# Patient Record
Sex: MALE | Employment: STUDENT | URBAN - METROPOLITAN AREA
[De-identification: names, ages, dates, MRNs, and addresses within clinical notes are randomized per-mention and may not be internally consistent; named-entity substitution may affect disease eponyms.]

---

## 2018-11-07 ENCOUNTER — HOSPITAL ENCOUNTER (EMERGENCY)
Facility: CLINIC | Age: 23
Discharge: HOME | End: 2018-11-08
Attending: EMERGENCY MEDICINE | Admitting: EMERGENCY MEDICINE

## 2018-11-07 ENCOUNTER — APPOINTMENT (OUTPATIENT)
Dept: CT IMAGING | Facility: CLINIC | Age: 23
End: 2018-11-07
Attending: EMERGENCY MEDICINE

## 2018-11-07 DIAGNOSIS — K52.9 GASTROENTERITIS: ICD-10-CM

## 2018-11-07 LAB
ALBUMIN SERPL-MCNC: 4.8 G/DL (ref 3.4–5)
ALP SERPL-CCNC: 88 U/L (ref 40–150)
ALT SERPL W P-5'-P-CCNC: 30 U/L (ref 0–70)
ANION GAP SERPL CALCULATED.3IONS-SCNC: 12 MMOL/L (ref 3–14)
AST SERPL W P-5'-P-CCNC: 19 U/L (ref 0–45)
BASOPHILS # BLD AUTO: 0 10E9/L (ref 0–0.2)
BASOPHILS NFR BLD AUTO: 0.2 %
BILIRUB SERPL-MCNC: 0.6 MG/DL (ref 0.2–1.3)
BUN SERPL-MCNC: 21 MG/DL (ref 7–30)
CALCIUM SERPL-MCNC: 10.2 MG/DL (ref 8.5–10.1)
CHLORIDE SERPL-SCNC: 103 MMOL/L (ref 94–109)
CO2 SERPL-SCNC: 21 MMOL/L (ref 20–32)
CREAT SERPL-MCNC: 1 MG/DL (ref 0.66–1.25)
DIFFERENTIAL METHOD BLD: ABNORMAL
EOSINOPHIL # BLD AUTO: 0.1 10E9/L (ref 0–0.7)
EOSINOPHIL NFR BLD AUTO: 0.6 %
ERYTHROCYTE [DISTWIDTH] IN BLOOD BY AUTOMATED COUNT: 12.1 % (ref 10–15)
FLUAV+FLUBV AG SPEC QL: NEGATIVE
FLUAV+FLUBV AG SPEC QL: NEGATIVE
GFR SERPL CREATININE-BSD FRML MDRD: >90 ML/MIN/1.7M2
GLUCOSE BLDC GLUCOMTR-MCNC: 121 MG/DL (ref 70–99)
GLUCOSE SERPL-MCNC: 145 MG/DL (ref 70–99)
HCT VFR BLD AUTO: 48.8 % (ref 40–53)
HGB BLD-MCNC: 16.8 G/DL (ref 13.3–17.7)
IMM GRANULOCYTES # BLD: 0 10E9/L (ref 0–0.4)
IMM GRANULOCYTES NFR BLD: 0.3 %
LACTATE BLD-SCNC: 3.5 MMOL/L (ref 0.7–2)
LIPASE SERPL-CCNC: 75 U/L (ref 73–393)
LYMPHOCYTES # BLD AUTO: 0.6 10E9/L (ref 0.8–5.3)
LYMPHOCYTES NFR BLD AUTO: 4.7 %
MCH RBC QN AUTO: 30.1 PG (ref 26.5–33)
MCHC RBC AUTO-ENTMCNC: 34.4 G/DL (ref 31.5–36.5)
MCV RBC AUTO: 87 FL (ref 78–100)
MONOCYTES # BLD AUTO: 1.3 10E9/L (ref 0–1.3)
MONOCYTES NFR BLD AUTO: 10.6 %
NEUTROPHILS # BLD AUTO: 10.3 10E9/L (ref 1.6–8.3)
NEUTROPHILS NFR BLD AUTO: 83.6 %
NRBC # BLD AUTO: 0 10*3/UL
NRBC BLD AUTO-RTO: 0 /100
PLATELET # BLD AUTO: 227 10E9/L (ref 150–450)
POTASSIUM SERPL-SCNC: 4.3 MMOL/L (ref 3.4–5.3)
PROT SERPL-MCNC: 9.1 G/DL (ref 6.8–8.8)
RBC # BLD AUTO: 5.59 10E12/L (ref 4.4–5.9)
SODIUM SERPL-SCNC: 136 MMOL/L (ref 133–144)
SPECIMEN SOURCE: NORMAL
WBC # BLD AUTO: 12.3 10E9/L (ref 4–11)

## 2018-11-07 PROCEDURE — 00000146 ZZHCL STATISTIC GLUCOSE BY METER IP

## 2018-11-07 PROCEDURE — 83690 ASSAY OF LIPASE: CPT | Performed by: EMERGENCY MEDICINE

## 2018-11-07 PROCEDURE — 99285 EMERGENCY DEPT VISIT HI MDM: CPT | Mod: 25

## 2018-11-07 PROCEDURE — 80053 COMPREHEN METABOLIC PANEL: CPT | Performed by: EMERGENCY MEDICINE

## 2018-11-07 PROCEDURE — 25000132 ZZH RX MED GY IP 250 OP 250 PS 637: Performed by: EMERGENCY MEDICINE

## 2018-11-07 PROCEDURE — 87804 INFLUENZA ASSAY W/OPTIC: CPT | Performed by: EMERGENCY MEDICINE

## 2018-11-07 PROCEDURE — 25000128 H RX IP 250 OP 636: Performed by: EMERGENCY MEDICINE

## 2018-11-07 PROCEDURE — 36415 COLL VENOUS BLD VENIPUNCTURE: CPT

## 2018-11-07 PROCEDURE — 96374 THER/PROPH/DIAG INJ IV PUSH: CPT | Mod: 59

## 2018-11-07 PROCEDURE — 87040 BLOOD CULTURE FOR BACTERIA: CPT | Performed by: EMERGENCY MEDICINE

## 2018-11-07 PROCEDURE — 85025 COMPLETE CBC W/AUTO DIFF WBC: CPT | Performed by: EMERGENCY MEDICINE

## 2018-11-07 PROCEDURE — 96361 HYDRATE IV INFUSION ADD-ON: CPT

## 2018-11-07 PROCEDURE — 83605 ASSAY OF LACTIC ACID: CPT | Performed by: EMERGENCY MEDICINE

## 2018-11-07 PROCEDURE — 74177 CT ABD & PELVIS W/CONTRAST: CPT

## 2018-11-07 RX ORDER — IOPAMIDOL 755 MG/ML
69 INJECTION, SOLUTION INTRAVASCULAR ONCE
Status: COMPLETED | OUTPATIENT
Start: 2018-11-07 | End: 2018-11-08

## 2018-11-07 RX ORDER — HYDROMORPHONE HYDROCHLORIDE 1 MG/ML
0.5 INJECTION, SOLUTION INTRAMUSCULAR; INTRAVENOUS; SUBCUTANEOUS
Status: COMPLETED | OUTPATIENT
Start: 2018-11-07 | End: 2018-11-07

## 2018-11-07 RX ORDER — SODIUM CHLORIDE, SODIUM LACTATE, POTASSIUM CHLORIDE, CALCIUM CHLORIDE 600; 310; 30; 20 MG/100ML; MG/100ML; MG/100ML; MG/100ML
INJECTION, SOLUTION INTRAVENOUS CONTINUOUS
Status: DISCONTINUED | OUTPATIENT
Start: 2018-11-07 | End: 2018-11-08 | Stop reason: HOSPADM

## 2018-11-07 RX ORDER — ACETAMINOPHEN 325 MG/1
975 TABLET ORAL ONCE
Status: COMPLETED | OUTPATIENT
Start: 2018-11-07 | End: 2018-11-07

## 2018-11-07 RX ADMIN — SODIUM CHLORIDE, POTASSIUM CHLORIDE, SODIUM LACTATE AND CALCIUM CHLORIDE 1860 ML: 600; 310; 30; 20 INJECTION, SOLUTION INTRAVENOUS at 23:51

## 2018-11-07 RX ADMIN — HYDROMORPHONE HYDROCHLORIDE 0.5 MG: 1 INJECTION, SOLUTION INTRAMUSCULAR; INTRAVENOUS; SUBCUTANEOUS at 23:30

## 2018-11-07 RX ADMIN — ACETAMINOPHEN 975 MG: 325 TABLET, FILM COATED ORAL at 23:30

## 2018-11-07 RX ADMIN — SODIUM CHLORIDE 1000 ML: 9 INJECTION, SOLUTION INTRAVENOUS at 23:20

## 2018-11-07 NOTE — ED AVS SNAPSHOT
Emergency Department    [address] [place] [address]    Phone:  [phone]    Fax:  [phone]                                       Kelton Tobias   MRN: [id number]    Department:   Emergency Department   Date of Visit:  11/7/2018           After Visit Summary Signature Page     I have received my discharge instructions, and my questions have been answered. I have discussed any challenges I see with this plan with the nurse or doctor.    ..........................................................................................................................................  Patient/Patient Representative Signature      ..........................................................................................................................................  Patient Representative Print Name and Relationship to Patient    ..................................................               ................................................  Date                                   Time    ..........................................................................................................................................  Reviewed by Signature/Title    ...................................................              ..............................................  Date                                               Time          22EPIC Rev 08/18

## 2018-11-07 NOTE — ED AVS SNAPSHOT
Emergency Department    [address] [facility] [address]    Phone:  [phone]    Fax:  [phone]                                       Kelton Tobias   MRN: [id number]    Department:   Emergency Department   Date of Visit:  11/7/2018           Patient Information     Date Of Birth          1995        Your diagnoses for this visit were:     Gastroenteritis        You were seen by Yarelis Franklin MD.      Follow-up Information     Follow up with Ahsan Gould MD. Schedule an appointment as soon as possible for a visit in 2 days.    Specialty:  Internal Medicine    Contact information:    [address] [id number] [phone]          Discharge Instructions       Please keep hydrated with lots of fluids.  May use zofran as needed for nausea and vomiting.  If you are unable to keep fluids down, unable to urinate, lightheaded or dizzy, develop abdominal pain, fevers not responsive to tylenol/ibuprofen or other acute changes return to the ED.  Otherwise please follow up with your PCP in 2-3 days for a recheck.      May use tylenol or ibuprofen for fever control    Discharge Instructions  Gastroenteritis    You have been seen today for vomiting (throwing up) and diarrhea (loose stools), called gastroenteritis or the stomach flu. This is usually caused by a virus, but some bacteria, parasites, medicines or other medical conditions can cause similar symptoms. At this time your provider does not find that your vomiting and diarrhea is a sign of anything dangerous or life-threatening.  However, sometimes the signs of serious illness do not show up right away. Remember that serious problems like appendicitis can look like gastroenteritis at first.       Generally, every Emergency Department visit should have a follow-up clinic visit with either a primary or a specialty clinic/provider. Please follow-up as instructed by your emergency provider today.    Return to the  Emergency Department if:    You keep vomiting and you are not able to keep liquids down.    You feel you are getting dehydrated, such as being very thirsty, not urinating (peeing), or feeling faint or lightheaded.     You develop a new fever.    You have abdominal (belly) pain that seems worse than cramps, is in one spot, or is getting worse over time.     You have blood in your vomit or in your diarrhea.    You feel very weak.    What can I do to help myself?    The most important thing to do is to drink clear liquids.  If you have been vomiting a lot, it is best to have only small, frequent sips of liquids.  Drinking too much at once may cause more vomiting. Water is a good first option for rehydration. If you are vomiting often, you must also replace electrolytes (salts and minerals) lost with your illness. Pedialyte  is the best rehydration liquid but many don t like the taste so sports drinks (like Gatorade ) are a good option. Sodas and juice are also options but are high in sugar. Avoid acid liquids (orange), caffeine (coffee) or alcohol. Do not drink milk until you no longer have diarrhea.    After liquids are staying down, you may start eating mild foods. Soda crackers, toast, plain noodles, gelatin, applesauce and bananas are good first choices.  Avoid foods that have acid, are spicy, fatty or fibrous (such as meats, coarse grains, vegetables). You may start eating these foods again in about 3 days when you are better.    Sometimes treatment includes prescription medicine to prevent nausea (sick to your stomach) and vomiting and to prevent diarrhea. If your provider prescribes these for you, take them as directed.    Nonprescription medicine is available for the treatment of diarrhea and can be very effective.  If you use it, make sure you use the dose recommended on the package. Avoid Lomotil . Check with your healthcare provider before you use any medicine for diarrhea.    Do not take ibuprofen, or other  nonsteroidal anti-inflammatory medicines without checking with your healthcare provider.  If you were given a prescription for medicine here today, be sure to read all of the information (including the package insert) that comes with your prescription.  This will include important information about the medicine, its side effects, and any warnings that you need to know about.  The pharmacist who fills the prescription can provide more information and answer questions you may have about the medicine.  If you have questions or concerns that the pharmacist cannot address, please call or return to the Emergency Department.   Remember that you can always come back to the Emergency Department if you are not able to see your regular provider in the amount of time listed above, if you get any new symptoms, or if there is anything that worries you.      24 Hour Appointment Hotline       To make an appointment at any [facility], call [phone] ([phone]). If you don't have a family doctor or clinic, we will help you find one. [facility] clinics are conveniently located to serve the needs of you and your family.             Review of your medicines      START taking        Dose / Directions Last dose taken    ondansetron 4 MG ODT tab   Commonly known as:  ZOFRAN ODT   Dose:  4 mg   Quantity:  10 tablet        Take 1 tablet (4 mg) by mouth every 8 hours as needed for nausea   Refills:  0                Prescriptions were sent or printed at these locations (1 Prescription)                   Other Prescriptions                Printed at Department/Unit printer (1 of 1)         ondansetron (ZOFRAN ODT) 4 MG ODT tab                Procedures and tests performed during your visit     Procedure/Test Number of Times Performed    Blood culture 2    CBC with platelets differential 1    CT Abdomen Pelvis w Contrast 1    Comprehensive metabolic panel 1    Give 20 ounces of water 15 minutes before CT of abdomen 1    Glucose by  meter 2    Glucose monitor nursing POCT 1    Influenza A/B antigen 1    Lactic acid 2    Lactic acid whole blood 1    Lipase 1    UA with Microscopic 1    Urine Culture Aerobic Bacterial 1      Orders Needing Specimen Collection     None      Pending Results     Date and Time Order Name Status Description    11/7/2018 2314 Blood culture Preliminary     11/7/2018 2314 Blood culture Preliminary     11/7/2018 2314 Urine Culture Aerobic Bacterial In process             Pending Culture Results     Date and Time Order Name Status Description    11/7/2018 2314 Blood culture Preliminary     11/7/2018 2314 Blood culture Preliminary     11/7/2018 2314 Urine Culture Aerobic Bacterial In process             Pending Results Instructions     If you had any lab results that were not finalized at the time of your Discharge, you can call the ED Lab Result RN at [phone]. You will be contacted by this team for any positive Lab results or changes in treatment. The nurses are available 7 days a week from 10A to 6:30P.  You can leave a message 24 hours per day and they will return your call.        Test Results From Your Hospital Stay        11/7/2018 10:59 PM      Component Results     Component Value Ref Range & Units Status    Glucose 121 (H) 70 - 99 mg/dL Final         11/7/2018 11:31 PM      Component Results     Component Value Ref Range & Units Status    WBC 12.3 (H) 4.0 - 11.0 10e9/L Final    RBC Count 5.59 4.4 - 5.9 10e12/L Final    Hemoglobin 16.8 13.3 - 17.7 g/dL Final    Hematocrit 48.8 40.0 - 53.0 % Final    MCV 87 78 - 100 fl Final    MCH 30.1 26.5 - 33.0 pg Final    MCHC 34.4 31.5 - 36.5 g/dL Final    RDW 12.1 10.0 - 15.0 % Final    Platelet Count 227 150 - 450 10e9/L Final    Diff Method Automated Method  Final    % Neutrophils 83.6 % Final    % Lymphocytes 4.7 % Final    % Monocytes 10.6 % Final    % Eosinophils 0.6 % Final    % Basophils 0.2 % Final    % Immature Granulocytes 0.3 % Final    Nucleated RBCs 0 0 /100  Final    Absolute Neutrophil 10.3 (H) 1.6 - 8.3 10e9/L Final    Absolute Lymphocytes 0.6 (L) 0.8 - 5.3 10e9/L Final    Absolute Monocytes 1.3 0.0 - 1.3 10e9/L Final    Absolute Eosinophils 0.1 0.0 - 0.7 10e9/L Final    Absolute Basophils 0.0 0.0 - 0.2 10e9/L Final    Abs Immature Granulocytes 0.0 0 - 0.4 10e9/L Final    Absolute Nucleated RBC 0.0  Final         11/7/2018 11:46 PM      Component Results     Component Value Ref Range & Units Status    Sodium 136 133 - 144 mmol/L Final    Potassium 4.3 3.4 - 5.3 mmol/L Final    Chloride 103 94 - 109 mmol/L Final    Carbon Dioxide 21 20 - 32 mmol/L Final    Anion Gap 12 3 - 14 mmol/L Final    Glucose 145 (H) 70 - 99 mg/dL Final    Urea Nitrogen 21 7 - 30 mg/dL Final    Creatinine 1.00 0.66 - 1.25 mg/dL Final    GFR Estimate >90 >60 mL/min/1.7m2 Final    Non  GFR Calc    GFR Estimate If Black >90 >60 mL/min/1.7m2 Final    African American GFR Calc    Calcium 10.2 (H) 8.5 - 10.1 mg/dL Final    Bilirubin Total 0.6 0.2 - 1.3 mg/dL Final    Albumin 4.8 3.4 - 5.0 g/dL Final    Protein Total 9.1 (H) 6.8 - 8.8 g/dL Final    Alkaline Phosphatase 88 40 - 150 U/L Final    ALT 30 0 - 70 U/L Final    AST 19 0 - 45 U/L Final         11/7/2018 11:30 PM      Component Results     Component Value Ref Range & Units Status    Lactic Acid 3.5 (H) 0.7 - 2.0 mmol/L Final         11/8/2018  3:15 AM      Component Results     Component Value Ref Range & Units Status    Color Urine Light Yellow  Final    Appearance Urine Clear  Final    Glucose Urine Negative NEG^Negative mg/dL Final    Bilirubin Urine Negative NEG^Negative Final    Ketones Urine Negative NEG^Negative mg/dL Final    Specific Gravity Urine 1.005 1.003 - 1.035 Final    Blood Urine Negative NEG^Negative Final    pH Urine 8.0 (H) 5.0 - 7.0 pH Final    Protein Albumin Urine Negative NEG^Negative mg/dL Final    Urobilinogen mg/dL Normal 0.0 - 2.0 mg/dL Final    Nitrite Urine Negative NEG^Negative Final    Leukocyte  Esterase Urine Negative NEG^Negative Final    Source Midstream Urine  Final    WBC Urine <1 0 - 5 /HPF Final    RBC Urine 1 0 - 2 /HPF Final    Mucous Urine Present (A) NEG^Negative /LPF Final         11/8/2018  2:33 AM         11/8/2018  2:49 AM      Component Results     Component    Specimen Description    Blood Right Arm    Special Requests    Aerobic and anaerobic bottles received    Culture Micro    No growth after 1 hour         11/8/2018  2:49 AM      Component Results     Component    Specimen Description    Blood Right Arm    Special Requests    Aerobic and anaerobic bottles received    Culture Micro    No growth after 2 hours         11/7/2018 11:44 PM      Component Results     Component Value Ref Range & Units Status    Lipase 75 73 - 393 U/L Final         11/8/2018  2:37 AM      Narrative     CT ABDOMEN AND PELVIS WITH CONTRAST  11/8/2018 12:13 AM     HISTORY: Left lower quadrant pain and fever. Vomiting. Diarrhea.    COMPARISON: None.    TECHNIQUE: Following the uneventful administration of 69 mL Isovue-370  intravenous contrast, helical sections were acquired from the top of  the diaphragm through the pubic symphysis. Coronal reconstructions  were generated. Radiation dose for this scan was reduced using  automated exposure control, adjustment of the mA and/or kV according  to the patient's size, or iterative reconstruction technique.    FINDINGS:  Abdomen: The liver, spleen, pancreas, adrenal glands and kidneys are  unremarkable. The gallbladder is present. No enlarged lymph nodes or  free fluid in the upper abdomen.    Scan through the lower chest is unremarkable.    Pelvis: The small and large bowel are normal in caliber. Fluid is  present in the colonic lumen. The appendix is unremarkable. No bowel  wall thickening, pneumatosis or free intraperitoneal gas. No enlarged  lymph nodes or free fluid in the pelvis.        Impression     IMPRESSION:  1. Fluid is present in the colonic lumen. This is  nonspecific, but  could relate to gastroenteritis.  2. No other cause of acute pain identified in the abdomen or pelvis.    JOHN MONTIEL MD         11/7/2018 11:48 PM      Component Results     Component Value Ref Range & Units Status    Influenza A/B Agn Specimen Nasal  Final    Influenza A Negative NEG^Negative Final    Influenza B Negative NEG^Negative Final    Test results must be correlated with clinical data. If necessary, results   should be confirmed by a molecular assay or viral culture.           11/8/2018  1:24 AM      Component Results     Component Value Ref Range & Units Status    Lactic Acid 3.0 (H) 0.4 - 2.0 mmol/L Final         11/8/2018 12:04 AM      Component Results     Component Value Ref Range & Units Status    Glucose 147 (H) 70 - 99 mg/dL Final         11/8/2018  2:19 AM      Component Results     Component Value Ref Range & Units Status    Lactic Acid 1.5 0.4 - 2.0 mmol/L Final                Clinical Quality Measure: Blood Pressure Screening     Your blood pressure was checked while you were in the emergency department today. The last reading we obtained was  BP: 107/60 . Please read the guidelines below about what these numbers mean and what you should do about them.  If your systolic blood pressure (the top number) is less than 120 and your diastolic blood pressure (the bottom number) is less than 80, then your blood pressure is normal. There is nothing more that you need to do about it.  If your systolic blood pressure (the top number) is 120-139 or your diastolic blood pressure (the bottom number) is 80-89, your blood pressure may be higher than it should be. You should have your blood pressure rechecked within a year by a primary care provider.  If your systolic blood pressure (the top number) is 140 or greater or your diastolic blood pressure (the bottom number) is 90 or greater, you may have high blood pressure. High blood pressure is treatable, but if left untreated over time it  "can put you at risk for heart attack, stroke, or kidney failure. You should have your blood pressure rechecked by a primary care provider within the next 4 weeks.  If your provider in the emergency department today gave you specific instructions to follow-up with your doctor or provider even sooner than that, you should follow that instruction and not wait for up to 4 weeks for your follow-up visit.        Thank you for choosing [facility]       Thank you for choosing [facility] for your care. Our goal is always to provide you with excellent care. Hearing back from our patients is one way we can continue to improve our services. Please take a few minutes to complete the written survey that you may receive in the mail after you visit with us. Thank you!        [facility] Information     [facility] lets you send messages to your doctor, view your test results, renew your prescriptions, schedule appointments and more. To sign up, go to [email or web address] . Click on \"Log in\" on the left side of the screen, which will take you to the Welcome page. Then click on \"Sign up Now\" on the right side of the page.     You will be asked to enter the access code listed below, as well as some personal information. Please follow the directions to create your username and password.     Your access code is: [email or web address]  Expires: 2019  2:50 AM     Your access code will  in 90 days. If you need help or a new code, please call your [facility] clinic or [phone].        Care EveryWhere ID     This is your Care EveryWhere ID. This could be used by other organizations to access your [facility] medical records  [id number]        Equal Access to Services     [facility] CenterSTEFANIA : Hadii jose m Lawson, waaxda luqadaha, qaybta kaalcaitlyn moralez . [facility] [phone].    ATENCIÓN: Si habla español, tiene a barber disposición servicios gratuitos de asistencia lingüística. Llame al " [phone].    We comply with applicable federal civil rights laws and [place] laws. We do not discriminate on the basis of race, color, national origin, age, disability, sex, sexual orientation, or gender identity.            After Visit Summary       This is your record. Keep this with you and show to your community pharmacist(s) and doctor(s) at your next visit.

## 2018-11-07 NOTE — LETTER
November 8, 2018      To Whom It May Concern:      Kelton Tobias was seen in our Emergency Department today, 11/08/18.  I expect his condition to improve over the next few days.  He may return to work/school when improved.    Sincerely,        Cathy Celeste RN

## 2018-11-08 VITALS
OXYGEN SATURATION: 94 % | TEMPERATURE: 99.5 F | WEIGHT: 136.69 LBS | DIASTOLIC BLOOD PRESSURE: 48 MMHG | HEIGHT: 68 IN | HEART RATE: 117 BPM | BODY MASS INDEX: 20.72 KG/M2 | SYSTOLIC BLOOD PRESSURE: 102 MMHG | RESPIRATION RATE: 24 BRPM

## 2018-11-08 LAB
ALBUMIN UR-MCNC: NEGATIVE MG/DL
APPEARANCE UR: CLEAR
BILIRUB UR QL STRIP: NEGATIVE
COLOR UR AUTO: ABNORMAL
GLUCOSE BLDC GLUCOMTR-MCNC: 147 MG/DL (ref 70–99)
GLUCOSE UR STRIP-MCNC: NEGATIVE MG/DL
HGB UR QL STRIP: NEGATIVE
KETONES UR STRIP-MCNC: NEGATIVE MG/DL
LACTATE SERPL-SCNC: 1.5 MMOL/L (ref 0.4–2)
LACTATE SERPL-SCNC: 3 MMOL/L (ref 0.4–2)
LEUKOCYTE ESTERASE UR QL STRIP: NEGATIVE
MUCOUS THREADS #/AREA URNS LPF: PRESENT /LPF
NITRATE UR QL: NEGATIVE
PH UR STRIP: 8 PH (ref 5–7)
RBC #/AREA URNS AUTO: 1 /HPF (ref 0–2)
SOURCE: ABNORMAL
SP GR UR STRIP: 1 (ref 1–1.03)
UROBILINOGEN UR STRIP-MCNC: NORMAL MG/DL (ref 0–2)
WBC #/AREA URNS AUTO: <1 /HPF (ref 0–5)

## 2018-11-08 PROCEDURE — 25000128 H RX IP 250 OP 636: Performed by: EMERGENCY MEDICINE

## 2018-11-08 PROCEDURE — 81001 URINALYSIS AUTO W/SCOPE: CPT | Performed by: EMERGENCY MEDICINE

## 2018-11-08 PROCEDURE — 87086 URINE CULTURE/COLONY COUNT: CPT | Performed by: EMERGENCY MEDICINE

## 2018-11-08 PROCEDURE — 83605 ASSAY OF LACTIC ACID: CPT | Performed by: EMERGENCY MEDICINE

## 2018-11-08 PROCEDURE — 25000125 ZZHC RX 250: Performed by: EMERGENCY MEDICINE

## 2018-11-08 RX ORDER — ONDANSETRON 4 MG/1
4 TABLET, ORALLY DISINTEGRATING ORAL EVERY 8 HOURS PRN
Qty: 10 TABLET | Refills: 0 | Status: SHIPPED | OUTPATIENT
Start: 2018-11-08

## 2018-11-08 RX ADMIN — IOPAMIDOL 69 ML: 755 INJECTION, SOLUTION INTRAVENOUS at 00:10

## 2018-11-08 RX ADMIN — SODIUM CHLORIDE 1000 ML: 9 INJECTION, SOLUTION INTRAVENOUS at 01:39

## 2018-11-08 RX ADMIN — SODIUM CHLORIDE, PRESERVATIVE FREE 61 ML: 5 INJECTION INTRAVENOUS at 00:10

## 2018-11-08 NOTE — DISCHARGE INSTRUCTIONS
Understanding Colitis   Colitis is when a part of your colon becomes inflamed or swollen. The colon is also called the large intestine. It helps with digestion and waste removal.   What causes colitis?   Colitis can be caused by many things. The most common causes are:    Viral or bacterial infections    Inflammatory bowel disease (ulcerative colitis or Crohn s disease)    Certain medicines, such as antibiotics    Radiation therapy to the colon   Symptoms of colitis   The symptoms of colitis may last a short time. Or they can be chronic. The most common symptoms are:    Diarrhea, sometimes bloody    Stomach pain or cramping    Fever    Weight loss in severe cases   Diagnosing colitis  Your healthcare provider will take a full health history and family history. He or she will also give you a physical exam. Depending on the results of your history and physical exam, your provider may also order certain tests to help find out the cause of your colitis. These may include:    Lab tests. Your blood and stool will be checked.    Endoscopy and biopsy.  Endoscopy uses a long, flexible tube with a tiny light and camera on one end to check the inside of your large intestine. When only the colon is checked, this is called a colonoscopy. During an endoscopy, your provider may take a small sample of your tissue to look at under a microscope. This is called a biopsy.  Treatment for colitis   Treatment for colitis depends on what is causing it and how serious your symptoms are. In some cases, you may not need treatment. For example, colitis from an infection may go away without care.   Treatment may include:     Medicines. You may take these by mouth (oral) or  as a rectal suppository or enema. They can lessen swelling and ease symptoms.    Changes in your diet. Some foods can make symptoms worse. Common triggers are milk, coffee, alcohol, and fried foods.    Surgery. In some cases, you may need surgery to remove a damaged part  of the colon.     Call 911  Call 911 if any of these occur:    Trouble breathing    Confusion    Very drowsy or trouble awakening    Fainting or loss of consciousness    Rapid heart rate    Chest pain   When to call your healthcare provider   Call your healthcare provider right away if you have any of these:    Symptoms that don t get better, or get worse    Fever of 100.4 F (38 C) or higher, or as directed by your healthcare provider    Pain that gets worse    Bloody diarrhea    Bleeding from your rectum    New symptoms      Date Last Reviewed: 12/1/2017 2000-2018 [employer]. [address] [place] [id number]. All rights reserved. This information is not intended as a substitute for professional medical care. Always follow your healthcare professional's instructions.          Understanding Colitis   Colitis is when a part of your colon becomes inflamed or swollen. The colon is also called the large intestine. It helps with digestion and waste removal.   What causes colitis?   Colitis can be caused by many things. The most common causes are:    Viral or bacterial infections    Inflammatory bowel disease (ulcerative colitis or Crohn s disease)    Certain medicines, such as antibiotics    Radiation therapy to the colon   Symptoms of colitis   The symptoms of colitis may last a short time. Or they can be chronic. The most common symptoms are:    Diarrhea, sometimes bloody    Stomach pain or cramping    Fever    Weight loss in severe cases   Diagnosing colitis  Your healthcare provider will take a full health history and family history. He or she will also give you a physical exam. Depending on the results of your history and physical exam, your provider may also order certain tests to help find out the cause of your colitis. These may include:    Lab tests. Your blood and stool will be checked.    Endoscopy and biopsy.  Endoscopy uses a long, flexible tube with a tiny light and camera on one  end to check the inside of your large intestine. When only the colon is checked, this is called a colonoscopy. During an endoscopy, your provider may take a small sample of your tissue to look at under a microscope. This is called a biopsy.  Treatment for colitis   Treatment for colitis depends on what is causing it and how serious your symptoms are. In some cases, you may not need treatment. For example, colitis from an infection may go away without care.   Treatment may include:     Medicines. You may take these by mouth (oral) or  as a rectal suppository or enema. They can lessen swelling and ease symptoms.    Changes in your diet. Some foods can make symptoms worse. Common triggers are milk, coffee, alcohol, and fried foods.    Surgery. In some cases, you may need surgery to remove a damaged part of the colon.     Call 911  Call 911 if any of these occur:    Trouble breathing    Confusion    Very drowsy or trouble awakening    Fainting or loss of consciousness    Rapid heart rate    Chest pain   When to call your healthcare provider   Call your healthcare provider right away if you have any of these:    Symptoms that don t get better, or get worse    Fever of 100.4 F (38 C) or higher, or as directed by your healthcare provider    Pain that gets worse    Bloody diarrhea    Bleeding from your rectum    New symptoms      Date Last Reviewed: 12/1/2017 2000-2018 [employer]. [address] [place] [address], [place] [address]. All rights reserved. This information is not intended as a substitute for professional medical care. Always follow your healthcare professional's instructions.          Understanding Colitis   Colitis is when a part of your colon becomes inflamed or swollen. The colon is also called the large intestine. It helps with digestion and waste removal.   What causes colitis?   Colitis can be caused by many things. The most common causes are:    Viral or bacterial infections    Inflammatory  bowel disease (ulcerative colitis or Crohn s disease)    Certain medicines, such as antibiotics    Radiation therapy to the colon   Symptoms of colitis   The symptoms of colitis may last a short time. Or they can be chronic. The most common symptoms are:    Diarrhea, sometimes bloody    Stomach pain or cramping    Fever    Weight loss in severe cases   Diagnosing colitis  Your healthcare provider will take a full health history and family history. He or she will also give you a physical exam. Depending on the results of your history and physical exam, your provider may also order certain tests to help find out the cause of your colitis. These may include:    Lab tests. Your blood and stool will be checked.    Endoscopy and biopsy.  Endoscopy uses a long, flexible tube with a tiny light and camera on one end to check the inside of your large intestine. When only the colon is checked, this is called a colonoscopy. During an endoscopy, your provider may take a small sample of your tissue to look at under a microscope. This is called a biopsy.  Treatment for colitis   Treatment for colitis depends on what is causing it and how serious your symptoms are. In some cases, you may not need treatment. For example, colitis from an infection may go away without care.   Treatment may include:     Medicines. You may take these by mouth (oral) or  as a rectal suppository or enema. They can lessen swelling and ease symptoms.    Changes in your diet. Some foods can make symptoms worse. Common triggers are milk, coffee, alcohol, and fried foods.    Surgery. In some cases, you may need surgery to remove a damaged part of the colon.     Call 911  Call 911 if any of these occur:    Trouble breathing    Confusion    Very drowsy or trouble awakening    Fainting or loss of consciousness    Rapid heart rate    Chest pain   When to call your healthcare provider   Call your healthcare provider right away if you have any of these:    Symptoms  that don t get better, or get worse    Fever of 100.4 F (38 C) or higher, or as directed by your healthcare provider    Pain that gets worse    Bloody diarrhea    Bleeding from your rectum    New symptoms      Date Last Reviewed: 12/1/2017 2000-2018 [employer]. [address] [place] [address]. All rights reserved. This information is not intended as a substitute for professional medical care. Always follow your healthcare professional's instructions.          How the Colon Works  The colon (large intestine) is a muscular tube that forms the last part of the digestive tract. It absorbs water and stores food waste. The colon is about 4 to 6 feet long. The rectum is the last 6 inches of the colon.      How food moves through the colon  Semiliquid food waste from the small intestine enters the colon at the beginning of the colon (cecum). As this waste, called stool, travels through the colon, it loses water and solidifies. Strong muscles keep the stool moving through the colon. The stool is moved toward the last section of the colon (sigmoid colon). From there, it passes into the rectum, where it is stored until it leaves the body through the anus during a bowel movement.  Date Last Reviewed: 8/1/2016 2000-2018 [employer]. [address] [place] [id number]. All rights reserved. This information is not intended as a substitute for professional medical care. Always follow your healthcare professional's instructions.          Anatomy of the Digestive System    Food gives the body the energy needed for life. The digestive system breaks food down into basic nutrients that can be used by the body. The digestive tract is a long, muscular tube that extends from the mouth through the stomach and intestines to the anus. As food moves along the digestive tract, it is digested (changed into substances that can be absorbed into the bloodstream). Certain organs (such as the liver,  gallbladder, and pancreas) help with this digestion. Parts of food that cannot be digested are turned into stool, which is waste material that is passed out of the body.  Digestive system  The digestive system is made up of the following:    The mouth takes in food, breaks it into pieces, and begins the process of digestion.    The esophagus moves food from the mouth to the stomach.    The stomach breaks food down into a liquid mixture.    The liver makes bile that helps digest fat.    The gallbladder stores bile.    The pancreas makes enzymes that help in digestion.    The small intestine digests food further and absorbs nutrients. What is left is passed on to the colon as liquid waste.    The large intestine (colon) absorbs water, salt, and minerals from the waste, forming a solid stool.    The rectum stores stool until a bowel movement happens.    The anus is the opening where stool leaves the body.  Date Last Reviewed: 7/1/2016 2000-2018 [employer]. [address] [place] [address]. All rights reserved. This information is not intended as a substitute for professional medical care. Always follow your healthcare professional's instructions.          Bacterial Diarrhea (Child)    Your child has bacterial gastroenteritis. This is an infection in the intestinal tract caused by bacteria. This can be more serious than what is called the  stomach flu,  which is caused by a virus. This infection causes diarrhea. Diarrhea is the passing of loose, watery stools 3 more times a day.  Your child may also have these symptoms:    Abdominal pain and cramping    Nausea and vomiting    Fever and chills    Bloody stools  The main danger from this illness is dehydration. This is the loss of too much water and minerals from the body. When this occurs, body fluids must be replaced. This can be done with oral rehydration solution. Oral rehydration solution is available at pharmacies and most grocery  stores.  Antibiotics are sometimes used to treat this type of infection.  Home care  Follow all instructions given by your child s healthcare provider.  If giving medicines to your child:    Don t give over-the-counter diarrhea medicines unless your child s healthcare provider tells you to.    If antibiotics were prescribed, make sure your child takes them as prescribed until they are finished. Don t stop giving them if your child feels better. Antibiotics must be taken as a full course. However, do not take antibiotics unless recommended by your healthcare provider. In some cases of bacterial diarrhea, they can cause more problems.    You can use acetaminophen or ibuprofen to control pain and fever. Or, you can use other medicine as prescribed.    Don t give aspirin to anyone under 18 years of age who has a fever. This may cause liver damage and a life-threatening condition called Reye syndrome.  To prevent the spread of illness:    Remember that washing with soap and water or using alcohol-based  is the best way to prevent the spread of infection. Wash your hands before and after caring for your sick child.    Clean the toilet after each use.    Keep your child out of day care until your child's healthcare providers says it's OK.    Wash your hands before and after preparing food. Keep in mind that people with diarrhea or vomiting should not prepare or serve food to others.    Wash your hands after using cutting boards, counter-tops, and knives that have been in contact with raw foods.    Wash all cooking utensils (knives, cutting boards) after they touch raw food.    Use a food thermometer when cooking. Cook poultry to at least 165 F (74 C). Cook ground meat (beef, veal, pork, lamb) to at least 160 F (71 C). Cook fresh beef, veal, lamb, and pork to at least 145 F (63 C).    Don t serve raw or undercooked eggs (poached or susan side up), poultry, meat, or unpasteurized milk and juices to your  child.    Don't eat foods prepared with unpasteurized milk    Keep uncooked meats away from cooked and ready-to-eat foods.  Giving liquids and food  The main goal while treating vomiting or diarrhea is to prevent dehydration. This is done by giving small amounts of liquids often.    Keep in mind that liquids are more important than food right now. Give small amounts of liquids at a time, especially if your child is having stomach cramps or vomiting.    For diarrhea: If you are giving milk to your child and the diarrhea is not going away, stop the milk. In some cases, milk can make diarrhea worse. If that happens, use oral rehydration solution instead. Don t give apple juice, sports drinks, soda, or other sweetened drinks. Drinks with sugar can make diarrhea worse.    For vomiting: Begin with oral rehydration solution at room temperature. Give 1 teaspoon (5 ml) every 5 minutes. Even if your child vomits, continue to give the solution. Much of the liquid will be absorbed, despite the vomiting. After 2 hours with no vomiting, begin with small amounts of milk or formula and other fluids. Increase the amount as tolerated. Don t give your child plain water, milk, formula, or other liquids until vomiting stops. As vomiting decreases, try giving larger amounts of oral rehydration solution. Space this out with more time in between. Continue this until your child is making urine and is no longer thirsty (has no interest in drinking). After 4 hours with no vomiting, restart solid foods. After 24 hours with no vomiting, resume a normal diet.    You can resume your child's normal diet over time as he or she feels better. Don t force your child to eat, especially if your child is having stomach pain or cramping. Don t feed your child large amounts at a time, even if he or she is hungry. This can make your child feel worse. You can give your child more food over time if he or she can tolerate it. Foods you can give include cereal,  mashed potatoes, applesauce, mashed bananas, crackers, dry toast, rice, oatmeal, bread, noodles, pretzels, soups with rice or noodles, and cooked vegetables.    If the symptoms come back, go back to a simple diet or clear liquids.  Follow-up care  Follow up with your child s healthcare provider, or as advised. If a stool sample was taken or cultures were done, call the healthcare provider for the results as instructed.  Call 911  Call 911 if your child has any of these symptoms:    Trouble breathing    Confusion    Extreme drowsiness or loss of consciousness    Extreme drowsiness or trouble walking    Rapid heart rate    Stiff neck    Seizure  When to seek medical advice  Call your child s healthcare provider right away if any of these occur:    Belly pain that gets worse    Constant lower right belly pain    Repeated vomiting after the first 2 hours on liquids    Occasional vomiting for more than 24 hours    Continued severe diarrhea for more than 24 hours    Blood in vomit or stool    Reduced oral intake    Dark urine or no urine for 4 to 6 hours in a younger child, or 6 to 8 hours in an older child, no tears when crying, sunken eyes, or dry mouth    Fussiness or crying that cannot be soothed    Unusual drowsiness    New rash    More than 8 diarrhea stools within 8 hours    Diarrhea lasts more than 10 days    Chest pain  Fever is an expected symptoms from bacterial diarrhea and usually is not a cause for concern. Unless advised otherwise by your child's healthcare provider, follow these general guidelines. (See Fever and children, below.)  Fever and children  Always use a digital thermometer to check your child s temperature. Never use a mercury thermometer.  For infants and toddlers, be sure to use a rectal thermometer correctly. A rectal thermometer may accidentally poke a hole in (perforate) the rectum. It may also pass on germs from the stool. Always follow the product maker s directions for proper use. If you  don t feel comfortable taking a rectal temperature, use another method. When you talk to your child s healthcare provider, tell him or her which method you used to take your child s temperature.  Here are guidelines for fever temperature. Ear temperatures aren t accurate before 6 months of age. Don t take an oral temperature until your child is at least 4 years old.  Infant under 3 months old:    Ask your child s healthcare provider how you should take the temperature.    Rectal or forehead (temporal artery) temperature of 100.4 F (38 C) or higher, or as directed by the provider    Armpit temperature of 99 F (37.2 C) or higher, or as directed by the provider  Child age 3 to 36 months:    Rectal, forehead (temporal artery), or ear temperature of 102 F (38.9 C) or higher, or as directed by the provider    Armpit temperature of 101 F (38.3 C) or higher, or as directed by the provider  Child of any age:    Repeated temperature of 104 F (40 C) or higher, or as directed by the provider    Fever that lasts more than 24 hours in a child under 2 years old. Or a fever that lasts for 3 days in a child 2 years or older.   Date Last Reviewed: 3/1/2018    0622-7266 [employer] [facility]. [address] [place] [address], [place] [address]. All rights reserved. This information is not intended as a substitute for professional medical care. Always follow your healthcare professional's instructions.          Understanding Colitis   Colitis is when a part of your colon becomes inflamed or swollen. The colon is also called the large intestine. It helps with digestion and waste removal.   What causes colitis?   Colitis can be caused by many things. The most common causes are:    Viral or bacterial infections    Inflammatory bowel disease (ulcerative colitis or Crohn s disease)    Certain medicines, such as antibiotics    Radiation therapy to the colon   Symptoms of colitis   The symptoms of colitis may last a short time. Or they can be  chronic. The most common symptoms are:    Diarrhea, sometimes bloody    Stomach pain or cramping    Fever    Weight loss in severe cases   Diagnosing colitis  Your healthcare provider will take a full health history and family history. He or she will also give you a physical exam. Depending on the results of your history and physical exam, your provider may also order certain tests to help find out the cause of your colitis. These may include:    Lab tests. Your blood and stool will be checked.    Endoscopy and biopsy.  Endoscopy uses a long, flexible tube with a tiny light and camera on one end to check the inside of your large intestine. When only the colon is checked, this is called a colonoscopy. During an endoscopy, your provider may take a small sample of your tissue to look at under a microscope. This is called a biopsy.  Treatment for colitis   Treatment for colitis depends on what is causing it and how serious your symptoms are. In some cases, you may not need treatment. For example, colitis from an infection may go away without care.   Treatment may include:     Medicines. You may take these by mouth (oral) or  as a rectal suppository or enema. They can lessen swelling and ease symptoms.    Changes in your diet. Some foods can make symptoms worse. Common triggers are milk, coffee, alcohol, and fried foods.    Surgery. In some cases, you may need surgery to remove a damaged part of the colon.     Call 911  Call 911 if any of these occur:    Trouble breathing    Confusion    Very drowsy or trouble awakening    Fainting or loss of consciousness    Rapid heart rate    Chest pain   When to call your healthcare provider   Call your healthcare provider right away if you have any of these:    Symptoms that don t get better, or get worse    Fever of 100.4 F (38 C) or higher, or as directed by your healthcare provider    Pain that gets worse    Bloody diarrhea    Bleeding from your rectum    New symptoms      Date  Last Reviewed: 12/1/2017 2000-2018 [employer]. [address] [place] [id number]. All rights reserved. This information is not intended as a substitute for professional medical care. Always follow your healthcare professional's instructions.          How the Colon Works  The colon (large intestine) is a muscular tube that forms the last part of the digestive tract. It absorbs water and stores food waste. The colon is about 4 to 6 feet long. The rectum is the last 6 inches of the colon.      How food moves through the colon  Semiliquid food waste from the small intestine enters the colon at the beginning of the colon (cecum). As this waste, called stool, travels through the colon, it loses water and solidifies. Strong muscles keep the stool moving through the colon. The stool is moved toward the last section of the colon (sigmoid colon). From there, it passes into the rectum, where it is stored until it leaves the body through the anus during a bowel movement.  Date Last Reviewed: 8/1/2016 2000-2018 [employer]. [address] [place] [id number]. All rights reserved. This information is not intended as a substitute for professional medical care. Always follow your healthcare professional's instructions.          Viral Diarrhea (Infant/Toddler)    Diarrhea caused by a virus is called viral gastroenteritis. Many people call it the  stomach flu,  but it has nothing to do with influenza. This virus affects the stomach and intestinal tract. It usually lasts 2 to 7 days. Diarrhea means passing loose watery stools 3 or more times a day.  Your child may also have these symptoms:    Abdominal pain and cramping    Nausea    Vomiting    Loss of bowel control    Fever and chills    Bloody stools  The main danger from this illness is dehydration. This is the loss of too much water and minerals from the body. When this occurs, body fluids must be replaced. This can be done with  oral rehydration solution. Oral rehydration solution is available at drugstores and most grocery stores. Sports drinks are not equivalent to oral rehydration solutions. Sports drinks contain too much sugar and too few electrolytes.  Antibiotics are not effective for this illness.  Home care  Follow all instructions given by your child s healthcare provider.  If giving medicines to your child:    Don t give over-the-counter diarrhea medicines unless your child s healthcare provider tells you to.    You can use acetaminophen or ibuprofen to control pain and fever. Or, you can use other medicine as prescribed.    Don t give aspirin to anyone under 18 years of age who has a fever. This may cause liver damage and a life-threatening condition called Reye syndrome.  Your child is considered contagious for as long as he or she has diarrhea. To prevent the spread of illness:    Remember that washing with soap and water and using alcohol-based  is the best way to prevent the spread of infection.    Wash your hands before and after caring for your sick child.    Clean the toilet after each use.    Dispose of soiled diapers in a sealed container.    Keep your child out of day care until he or she is cleared by the healthcare provider.    Wash your hands before and after preparing food.    Wash your hands and utensils after using cutting boards, counter-tops and knives that have been in contact with raw foods.    Keep uncooked meats away from cooked and ready-to-eat foods.  Giving liquids and feeding  The main goal while treating vomiting or diarrhea is to prevent dehydration. This is done by giving small amounts of liquids often. Liquids are the most important thing. Don t be in a rush to give food to your child.  If your baby is :    Keep breastfeeding. Feed your child more often than usual.    If diarrhea is severe, give oral rehydration solution between feedings.    As diarrhea eases, stop giving the  rehydration solution and go back to your normal breastfeeding schedule.  If your baby is bottle-fed:    Give small amounts of fluid at a time, especially if your child is vomiting. An ounce or two (30 to 60 mL) every 30 minutes may improve symptoms. Start with 1 teaspoon (5 mL) every 5 minutes and increase gradually as tolerated.    Give full-strength formula or milk. If diarrhea is severe, give oral rehydration solution between feedings.    If giving milk and the diarrhea is not getting better, stop giving milk. In some cases, milk can make diarrhea worse. Try soy or rice formula.    Don t give apple juice, soda, or other sweetened drinks. Drinks with sugar can make diarrhea worse.    If your child is doing well after 24 hours, resume a regular diet and feeding schedule.    If they start doing worse with food, go back to clear liquids.  If your child is on solid food:    Keep in mind that liquids are more important than food right now. Don t be in a rush to give food.    Don t force your child to eat, especially if he or she is having stomach pain, cramping, vomiting, or diarrhea.    Don t feed your child large amounts at a time, even if your child is hungry. This can make your child feel worse. You can give your child more food over time if he or she can tolerate it.    Give small amounts at a time, especially if the child is having stomach cramps or vomiting.    If you are giving milk to your child and the diarrhea is not going away, stop the milk. In some cases, milk can make diarrhea worse. If that happens, use oral rehydration solution instead. This sensitivity to milk usually resolves as the intestine heals.    If diarrhea is severe, give oral rehydration solution between feedings.    If your child is doing well after 24 hours, try giving solid foods. These can include cereal, oatmeal, bread, noodles, mashed carrots, mashed bananas, mashed potatoes, applesauce, dry toast, crackers, soups with rice noodles,  and cooked vegetables.    For a baby over 4 months, as he or she feels better, you may give cereal, mashed potatoes, applesauce, mashed bananas, or strained carrots, during this time. A baby over 1 year may have crackers, white bread, rice, and other complex starches, lean meats, yogurt, fruits, and vegetables. Low fat diets are easier to digest than high fat diets.    If your child starts doing worse with food, go back to clear liquids.    You can resume your child's normal diet over time as he or she feels better. If the diarrhea or cramping gets worse again, go back to a simple diet or clear liquids.  Follow-up care  Follow up with your child s healthcare provider, or as advised. If a stool sample was taken or cultures were done, call the healthcare provider for the results as instructed.  Call 911  Call 911 if your child has any of these symptoms:    Trouble breathing    Confusion    Extreme drowsiness or trouble walking    Loss of consciousness    Rapid heart rate    Chest pain    Stiff neck    Seizure  When to seek medical advice  Call your child s healthcare provider right away if any of these occur:    Abdominal pain that gets worse    Constant lower right abdominal pain    More than 8 diarrhea stools within 8 hours    Continued severe diarrhea for more than 24 hours    Blood in stool    Refusal to drink or feed    Dark urine or no urine for  or dry diaper for 4 to 6 hours, no tears when crying, sunken eyes, or dry mouth    Fussiness or crying that can t be soothed    Unusual drowsiness    New rash    Diarrhea lasts more than 1 week on antibiotics    Fever (see Fever and children, below)  Fever and children  Always use a digital thermometer to check your child s temperature. Never use a mercury thermometer.  For infants and toddlers, be sure to use a rectal thermometer correctly. A rectal thermometer may accidentally poke a hole in (perforate) the rectum. It may also pass on germs from the stool. Always  follow the product maker s directions for proper use. If you don t feel comfortable taking a rectal temperature, use another method. When you talk to your child s healthcare provider, tell him or her which method you used to take your child s temperature.  Here are guidelines for fever temperature. Ear temperatures aren t accurate before 6 months of age. Don t take an oral temperature until your child is at least 4 years old.  Infant under 3 months old:    Ask your child s healthcare provider how you should take the temperature.    Rectal or forehead (temporal artery) temperature of 100.4 F (38 C) or higher, or as directed by the provider    Armpit temperature of 99 F (37.2 C) or higher, or as directed by the provider  Child age 3 to 36 months:    Rectal, forehead (temporal artery), or ear temperature of 102 F (38.9 C) or higher, or as directed by the provider    Armpit temperature of 101 F (38.3 C) or higher, or as directed by the provider  Child of any age:    Repeated temperature of 104 F (40 C) or higher, or as directed by the provider    Fever that lasts more than 24 hours in a child under 2 years old. Or a fever that lasts for 3 days in a child 2 years or older.   Date Last Reviewed: 3/1/2018    3140-9530 [employer]. [address] [place] [address], [place] [address]. All rights reserved. This information is not intended as a substitute for professional medical care. Always follow your healthcare professional's instructions.          Noninfectious Gastroenteritis (Adult)    Gastroenteritis can cause nausea, vomiting, diarrhea, and cramping in the belly. This may occur from food sensitivity, inflammation of your gastrointestinal tract, medicines, stress, or other causes not related to infection. Your symptoms will usually last from 1 to 3 days, but can last longer. Antibiotics are not effective, but simple home treatment will be helpful.  Home care  Medicine    You may use acetaminophen or NSAID medicines  like ibuprofen or naproxen to control fever, unless another medicine is prescribed. (Note: If you have chronic liver or kidney disease, or ever had a stomach ulcer or gastrointestinaI bleeding, talk with your healthcare provider before using these medicines.) Aspirin should never be used in anyone under 18 years of age who is ill with a fever. It may cause severe liver damage. Don't increase your NSAID medicines if you are already taking these medicines for another condition (like arthritis). Don't use NSAIDS if you are on aspirin (such as for heart disease, or after a stroke).    If medicines for diarrhea or vomiting are prescribed, take only as directed.  General care and preventing spread of the illness    If symptoms are severe, rest at home for the next 24 hours or until you feel better.    Hand washing with soap and water is the best way to prevent the spread of infection. Wash your hands after touching anyone who is sick.    Wash your hands after using the toilet and before meals. Clean the toilet after each use.    Caffeine, tobacco, and alcohol can make your diarrhea, cramping, and pain worse.  Diet    Water and clear liquids are important so you do not get dehydrated. Drink a small amount at a time.    Don't force yourself to eat, especially if you have cramps, vomiting, or diarrhea. When you finally decide to start eating, do not eat large amounts at a time, even if you are hungry.    If you eat, avoid fatty, greasy, spicy, or fried foods.    Don't eat dairy products if you have diarrhea; they can make the diarrhea worse.  During the first 24 hours (the first full day), follow the diet below:    Beverages: Water, clear liquids, soft drinks without caffeine, like ginger ale; mineral water (plain or flavored); decaffeinated tea and coffee.    Soups: Clear broth, consommé, and bouillon sports drinks aren't a good choice because they have too much sugar and not enough electrolytes. In this case, commercially  available products called oral rehydration solutions are best.    Desserts: Plain gelatin, ice pops, and fruit juice bars  During the next 24 hours (the second day), you may add the following to the above if you have improved. If not, continue what you did the first day:    Hot cereal, plain toast, bread, rolls, crackers    Plain noodles, rice, mashed potatoes, chicken noodle or rice soup    Unsweetened canned fruit and bananas (don't eat pineapple or citrus)    Limit caffeine and chocolate. No spices or seasonings except salt.  During the next 24 hours    Gradually resume a normal diet, as you feel better and your symptoms improve.    If at any time your symptoms start getting worse, go back to clear liquids until you feel better.  Food preparation    If you have diarrhea, you should not prepare food for others. When you  prepare food for yourself, wash your hands before and after.    Wash your hands after using cutting boards, countertops, and knives that have been in contact with raw food.    Keep uncooked meats away from cooked and ready-to-eat foods.  Follow-up care  Follow up with your healthcare provider if you are not improving over the next 2 to 3 days, or as advised. If a stool (diarrhea) sample was taken, call for the results as directed.  Call 911  Call 911 if any of these occur:    Trouble breathing    Chest pain    Confusion    Severe drowsiness or trouble awakening    Seizure    Stiff neck  When to seek medical advice  Call your healthcare provider right away if any of these occur:     Increasing belly pain or constant lower right belly pain    Continued vomiting (unable to keep liquids down)    Frequent diarrhea (more than 5 times a day)    Blood in vomit or stool (black or red color)    Inability to tolerate solid food after a few days.    Dark urine, reduced urine output    Weakness, dizziness    Drowsiness    Fever of 100.4 F (38.0 C) or higher, or as directed by your healthcare provider    New  rash  Date Last Reviewed: 11/16/2015 2000-2018 [employer]. [address] [place] [address], [place] [address]. All rights reserved. This information is not intended as a substitute for professional medical care. Always follow your healthcare professional's instructions.      Please keep hydrated with lots of fluids.  May use zofran as needed for nausea and vomiting.  If you are unable to keep fluids down, unable to urinate, lightheaded or dizzy, develop abdominal pain, fevers not responsive to tylenol/ibuprofen or other acute changes return to the ED.  Otherwise please follow up with your PCP in 2-3 days for a recheck.      May use tylenol or ibuprofen for fever control    Discharge Instructions  Gastroenteritis    You have been seen today for vomiting (throwing up) and diarrhea (loose stools), called gastroenteritis or the stomach flu. This is usually caused by a virus, but some bacteria, parasites, medicines or other medical conditions can cause similar symptoms. At this time your provider does not find that your vomiting and diarrhea is a sign of anything dangerous or life-threatening.  However, sometimes the signs of serious illness do not show up right away. Remember that serious problems like appendicitis can look like gastroenteritis at first.       Generally, every Emergency Department visit should have a follow-up clinic visit with either a primary or a specialty clinic/provider. Please follow-up as instructed by your emergency provider today.    Return to the Emergency Department if:    You keep vomiting and you are not able to keep liquids down.    You feel you are getting dehydrated, such as being very thirsty, not urinating (peeing), or feeling faint or lightheaded.     You develop a new fever.    You have abdominal (belly) pain that seems worse than cramps, is in one spot, or is getting worse over time.     You have blood in your vomit or in your diarrhea.    You feel very weak.    What can I  do to help myself?    The most important thing to do is to drink clear liquids.  If you have been vomiting a lot, it is best to have only small, frequent sips of liquids.  Drinking too much at once may cause more vomiting. Water is a good first option for rehydration. If you are vomiting often, you must also replace electrolytes (salts and minerals) lost with your illness. Pedialyte  is the best rehydration liquid but many don t like the taste so sports drinks (like Gatorade ) are a good option. Sodas and juice are also options but are high in sugar. Avoid acid liquids (orange), caffeine (coffee) or alcohol. Do not drink milk until you no longer have diarrhea.    After liquids are staying down, you may start eating mild foods. Soda crackers, toast, plain noodles, gelatin, applesauce and bananas are good first choices.  Avoid foods that have acid, are spicy, fatty or fibrous (such as meats, coarse grains, vegetables). You may start eating these foods again in about 3 days when you are better.    Sometimes treatment includes prescription medicine to prevent nausea (sick to your stomach) and vomiting and to prevent diarrhea. If your provider prescribes these for you, take them as directed.    Nonprescription medicine is available for the treatment of diarrhea and can be very effective.  If you use it, make sure you use the dose recommended on the package. Avoid Lomotil . Check with your healthcare provider before you use any medicine for diarrhea.    Do not take ibuprofen, or other nonsteroidal anti-inflammatory medicines without checking with your healthcare provider.  If you were given a prescription for medicine here today, be sure to read all of the information (including the package insert) that comes with your prescription.  This will include important information about the medicine, its side effects, and any warnings that you need to know about.  The pharmacist who fills the prescription can provide more  information and answer questions you may have about the medicine.  If you have questions or concerns that the pharmacist cannot address, please call or return to the Emergency Department.   Remember that you can always come back to the Emergency Department if you are not able to see your regular provider in the amount of time listed above, if you get any new symptoms, or if there is anything that worries you.

## 2018-11-08 NOTE — ED PROVIDER NOTES
History     Chief Complaint:    Abdominal pain.     The history is limited by a language barrier (The patient's classmates translated).   Patient offered ipad but prefers friends to assist.       Kelton Tobias is an otherwise healthy 22 year old male from [facility] who presents to the ED with his classmates for evaluation of stomach pain. The patient states that he had a cold and mild headache for about the past week. Today, the patient was on a bus to the [facility] as is has a flight back to [facility] in the morning and when he started to develop severe abdominal pain, diarrhea, and vomiting. The patient was subsequently brought to the ED for further evaluation. The patient is febrile on arrival and complains of chills as well as a mild headache. He also reports that his abdominal pain waxes and wanes in intensity. He otherwise denies eating any new food, blood in his stool, dysuria, or blood in his urine. His friends state his last food intake was around noon and he had a lemonade about 30 minutes prior to arrival.     Allergies:  The patient has no known drug allergies.    Medications:    The patient is currently on no regular medications.    Past Medical History:    Hypoglycemia    Past Surgical History:    The patient does not have any pertinent past surgical history.    Family History:    No past pertinent family history.    Social History:  International student from [facility]; been in [place] four [place].   Presents with classmates.   Negative for tobacco use.  Negative for alcohol use.  Supposed to go back to [place] tomorrow (11/8).     Review of Systems   All other systems reviewed and are negative.      Physical Exam     Patient Vitals for the past 24 hrs:   BP Temp Temp src Pulse Heart Rate Resp SpO2 Height Weight   11/08/18 0315 107/60 - - - - - 95 % - -   11/08/18 0230 109/58 - - - - - 97 % - -   11/08/18 0145 111/56 - - - 99 - 99 % - -   11/08/18 0141 - - - - - - 99 % - -   11/08/18 0140 103/56 - - - - - 99 % - -  "  11/08/18 0120 - - - - 91 - 98 % - -   11/08/18 0019 119/69 99.5  F (37.5  C) Oral - 98 - 99 % - -   11/07/18 2350 - - - - - - 97 % - -   11/07/18 2349 - - - - - - 98 % - -   11/07/18 2348 - - - - - - 98 % - -   11/07/18 2347 - - - - - - 99 % - -   11/07/18 2346 - - - - - - 98 % - -   11/07/18 2345 - - - - - - 98 % - -   11/07/18 2343 - - - - - - 98 % - -   11/07/18 2342 - - - - - - 98 % - -   11/07/18 2341 - - - - - - 98 % - -   11/07/18 2340 110/65 - - - - - 98 % - -   11/07/18 2339 - - - - - - 97 % - -   11/07/18 2338 - - - - - - 93 % - -   11/07/18 2337 112/65 - - - - - 92 % - -   11/07/18 2247 - - - - - - 98 % - -   11/07/18 2242 99/61 101.1  F (38.4  C) Temporal 117 - 24 - 1.73 m (5' 8.11\") 62 kg (136 lb 11 oz)         Physical Exam  General: Resting on the bed appears uncomfortable.  Head: No obvious trauma to head.  Ears, Nose, Throat:  External ears normal.  Nose normal.  Dry MM  Eyes:  Conjunctivae clear.  Pupils are equal, round, and reactive.   Neck: Normal range of motion.  Neck supple.   CV: tachycardic rate and rhythm.  No murmurs.      Respiratory: Effort normal and breath sounds normal.  No wheezing or crackles.   Gastrointestinal: Soft.  No distension. There is LLQ tenderness.    Neuro: Alert. Moving all extremities appropriately.  Normal speech.    Skin: Skin is warm and dry.  No rash noted.       Emergency Department Course   Imaging:  Radiographic findings were communicated with the patient who voiced understanding of the findings.  CT Abdomen pelvis with contrast:   1. Fluid is present in the colonic lumen. This is nonspecific, but  could relate to gastroenteritis.  2. No other cause of acute pain identified in the abdomen or pelvis as per radiology.      Laboratory:  CBC: WBC: 12.3 (H), HGB: 16.8, PLT: 227  CMP: Glucose 145 (H), Calcium 10.2 (H), Protein total 9.1 (H), o/w WNL (Creatinine: 1.00)    Lipase: 75  2305 Lactic acid: 3.5 (H)  0100 Lactic acid: 3.0 (H)  0210 Lactic acid: " 1.5     Glucose by meter: 121 (H)  2351 Glucose by meter: 147 (H)    UA with Microscopic: pH 8.0 (H), Mucous present (A), o/w WNL  Urine culture: pending     Influenza A/B: negative  Blood cultures (X2): pending      Interventions:  2320 NS 1,000 mLs IV  2330 Tylenol 975 mg PO   Dilaudid 0.5 mg IV  0139 NS 1,000 mLs      Emergency Department Course:  Nursing notes and vitals reviewed. (230) I performed an exam of the patient as documented above.     IV inserted. Medicine administered as documented above. Blood drawn. This was sent to the lab for further testing, results above.     The patient provided a urine sample here in the emergency department. This was sent for laboratory testing, findings above.     The patient was sent for a abdomen/pelvis CT while in the emergency department, findings above.     0212 I rechecked the patient and discussed the results of his workup thus far.     0317 I rechecked the patient and discussed the results of his workup thus far.     Findings and plan explained to the Patient. Patient discharged home with instructions regarding supportive care, medications, and reasons to return. The importance of close follow-up was reviewed. The patient was prescribed Zofran,    I personally reviewed the laboratory results with the Patient and answered all related questions prior to discharge.     Impression & Plan    CMS Diagnoses: The Lactic acid level is elevated due to dehydration, at this time there is no sign of severe sepsis or septic shock. and None     Medical Decision Makin-year-old male otherwise healthy presenting with abdominal pain, vomiting, diarrhea.  Vital signs notable for initial tachycardia, and fever.  Broad differential pursued including not limited to obstruction, perforation, UTI, gastroenteritis, appendicitis, electrolyte metabolic or renal dysfunction, influenza, dehydration, etc.  Overall patient's well-appearing but does appear quite uncomfortable.  Initially  septic workup was undertaken as patient was febrile and tachycardic.  CBC shows mild leukocytosis 12.3.  Lactate initially elevated at 3.5.  Influenza swab is negative.  Urinalysis is bland without evidence of acute infection.  No evidence of blood either.  Blood cultures pending but unlikely to yield bacteremic results.  BMP shows no acute electrolyte metabolic or renal dysfunction.  LFTs and lipase are reassuring and not concerning for obstructive biliary etiology, hepatitis, pancreatitis.  Given his exquisite tenderness on exam did have a CT scan.  CT shows a fluid-filled colonic lumen concerning for possible gastroenteritis.  After fluid hydration lactate continue to trend in the improvement range from 3.5-3-1.5.  Patient is likely quite dehydrated leading to the lactic acidosis.  Do not see any other concerning signs of bacterial sepsis or septic shock.  Patient's fever and tachycardia resolved with above interventions.  He is able to tolerate PO.  Given the gastroenteritis is most likely being viral did hold off on antibiotics as that did not seem appropriate.  Patient was given Zofran and felt better.  At length talk to patient about flying home to [facility] today.  [place] that this was not advisable and advised him against it.  He is alert oriented and is making his own decisions.  He does not meet hold criteria and is appropriate for discharge home.  Patient will be discharged home.  Advise close follow-up with primary doctor.  Strict return precautions were discussed.      Diagnosis:    ICD-10-CM    1. Gastroenteritis K52.9        Disposition:  discharged to home    Discharge Medications:  New Prescriptions    ONDANSETRON (ZOFRAN ODT) 4 MG ODT TAB    Take 1 tablet (4 mg) by mouth every 8 hours as needed for nausea     Scribe Disclosure:  I,  Ignacio Lu, am serving as a scribe on 11/7/2018 at 11:06 PM to personally document services performed by Yarelis Franklin MD based on my observations and the  provider's statements to me.          Ignacio Lu  11/7/2018    EMERGENCY DEPARTMENT       Yarelis Franklin MD  11/08/18 0414

## 2018-11-09 LAB
BACTERIA SPEC CULT: NORMAL
Lab: NORMAL
SPECIMEN SOURCE: NORMAL

## 2018-11-13 LAB
BACTERIA SPEC CULT: NO GROWTH
Lab: NORMAL
SPECIMEN SOURCE: NORMAL

## 2018-11-14 LAB
BACTERIA SPEC CULT: NO GROWTH
Lab: NORMAL
SPECIMEN SOURCE: NORMAL